# Patient Record
Sex: MALE | Race: WHITE | Employment: FULL TIME | URBAN - METROPOLITAN AREA
[De-identification: names, ages, dates, MRNs, and addresses within clinical notes are randomized per-mention and may not be internally consistent; named-entity substitution may affect disease eponyms.]

---

## 2021-09-06 ENCOUNTER — HOSPITAL ENCOUNTER (OUTPATIENT)
Age: 31
Discharge: HOME | End: 2021-09-06
Attending: PHYSICIAN ASSISTANT
Payer: COMMERCIAL

## 2021-09-06 VITALS
OXYGEN SATURATION: 100 % | DIASTOLIC BLOOD PRESSURE: 91 MMHG | HEART RATE: 60 BPM | RESPIRATION RATE: 16 BRPM | HEIGHT: 71 IN | SYSTOLIC BLOOD PRESSURE: 145 MMHG | WEIGHT: 210 LBS | TEMPERATURE: 98 F | BODY MASS INDEX: 29.4 KG/M2

## 2021-09-06 DIAGNOSIS — R03.0 ELEVATED BLOOD PRESSURE READING: ICD-10-CM

## 2021-09-06 DIAGNOSIS — S05.02XA ABRASION OF LEFT CORNEA, INITIAL ENCOUNTER: Primary | ICD-10-CM

## 2021-09-06 PROCEDURE — 99203 OFFICE O/P NEW LOW 30 MIN: CPT | Performed by: PHYSICIAN ASSISTANT

## 2021-09-06 PROCEDURE — 90715 TDAP VACCINE 7 YRS/> IM: CPT | Performed by: PHYSICIAN ASSISTANT

## 2021-09-06 PROCEDURE — 90471 IMMUNIZATION ADMIN: CPT | Performed by: PHYSICIAN ASSISTANT

## 2021-09-06 RX ORDER — POLYMYXIN B SULFATE AND TRIMETHOPRIM 1; 10000 MG/ML; [USP'U]/ML
1 SOLUTION OPHTHALMIC
Qty: 10 ML | Refills: 0 | Status: SHIPPED | OUTPATIENT
Start: 2021-09-06 | End: 2021-09-06

## 2021-09-06 RX ORDER — PURIFIED WATER 986 MG/ML
1 SOLUTION OPHTHALMIC ONCE
Status: COMPLETED | OUTPATIENT
Start: 2021-09-06 | End: 2021-09-06

## 2021-09-06 RX ORDER — POLYMYXIN B SULFATE AND TRIMETHOPRIM 1; 10000 MG/ML; [USP'U]/ML
1 SOLUTION OPHTHALMIC
Qty: 10 ML | Refills: 0 | Status: SHIPPED | OUTPATIENT
Start: 2021-09-06 | End: 2021-09-13

## 2021-09-06 RX ORDER — TETRACAINE HYDROCHLORIDE 5 MG/ML
1 SOLUTION OPHTHALMIC ONCE
Status: COMPLETED | OUTPATIENT
Start: 2021-09-06 | End: 2021-09-06

## 2021-09-06 NOTE — ED PROVIDER NOTES
Patient Seen in: [facility]    History   Patient presents with:  Eye Problem    Stated Complaint: eye problem    HPI    28-year-old male presents with chief complaint of dog scratch to the left eye.   Onset 1 hour prior to arrival.  Pain scal No abnormalities of mood, affect. Head: Normocephalic/atraumatic. Eyes: Pupils are equal round reactive to light. Conjunctivae within normal limits bilaterally. No ocular discharge. No eyelid swelling or erythema. Nontender to palpation.   Extraocular hypertension. Education regarding lifestyle modifications and the need for appropriate follow-up with their PCP to have their blood pressure re-checked within 24-48 hours was provided.     I spent a total of 22 minutes during chart review, obtaining histor

## 2022-02-07 ENCOUNTER — HOSPITAL ENCOUNTER (OUTPATIENT)
Dept: ULTRASOUND IMAGING | Facility: HOSPITAL | Age: 32
Discharge: HOME | End: 2022-02-07
Attending: NURSE PRACTITIONER

## 2022-02-07 ENCOUNTER — OFFICE VISIT (OUTPATIENT)
Dept: FAMILY MEDICINE CLINIC | Facility: CLINIC | Age: 32
End: 2022-02-07

## 2022-02-07 VITALS
BODY MASS INDEX: 26.35 KG/M2 | RESPIRATION RATE: 16 BRPM | SYSTOLIC BLOOD PRESSURE: 110 MMHG | DIASTOLIC BLOOD PRESSURE: 62 MMHG | HEART RATE: 61 BPM | WEIGHT: 188.19 LBS | OXYGEN SATURATION: 97 % | HEIGHT: 71 IN

## 2022-02-07 DIAGNOSIS — Z00.00 ADULT GENERAL MEDICAL EXAMINATION: Primary | ICD-10-CM

## 2022-02-07 DIAGNOSIS — Z86.16 HISTORY OF COVID-19: ICD-10-CM

## 2022-02-07 DIAGNOSIS — M79.604 RIGHT LEG PAIN: ICD-10-CM

## 2022-02-07 DIAGNOSIS — F41.1 GENERALIZED ANXIETY DISORDER: ICD-10-CM

## 2022-02-07 PROCEDURE — 99203 OFFICE O/P NEW LOW 30 MIN: CPT | Performed by: NURSE PRACTITIONER

## 2022-02-07 PROCEDURE — 93971 EXTREMITY STUDY: CPT | Performed by: NURSE PRACTITIONER

## 2022-02-07 PROCEDURE — 99385 PREV VISIT NEW AGE 18-39: CPT | Performed by: NURSE PRACTITIONER

## 2022-02-07 PROCEDURE — 3008F BODY MASS INDEX DOCD: CPT | Performed by: NURSE PRACTITIONER

## 2022-02-07 PROCEDURE — 3078F DIAST BP <80 MM HG: CPT | Performed by: NURSE PRACTITIONER

## 2022-02-07 PROCEDURE — 3074F SYST BP LT 130 MM HG: CPT | Performed by: NURSE PRACTITIONER

## 2023-06-02 ENCOUNTER — APPOINTMENT (OUTPATIENT)
Dept: GENERAL RADIOLOGY | Age: 33
End: 2023-06-02
Attending: NURSE PRACTITIONER
Payer: COMMERCIAL

## 2023-06-02 ENCOUNTER — HOSPITAL ENCOUNTER (OUTPATIENT)
Age: 33
Discharge: HOME | End: 2023-06-02
Payer: COMMERCIAL

## 2023-06-02 VITALS
DIASTOLIC BLOOD PRESSURE: 86 MMHG | OXYGEN SATURATION: 99 % | RESPIRATION RATE: 18 BRPM | SYSTOLIC BLOOD PRESSURE: 153 MMHG | HEART RATE: 68 BPM
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
## 2023-06-02 DIAGNOSIS — S92.254A CLOSED NONDISPLACED FRACTURE OF NAVICULAR BONE OF RIGHT FOOT, INITIAL ENCOUNTER: ICD-10-CM

## 2023-06-02 DIAGNOSIS — S82.841A BIMALLEOLAR FRACTURE OF RIGHT ANKLE, CLOSED, INITIAL ENCOUNTER: ICD-10-CM

## 2023-06-02 PROCEDURE — 29515 APPLICATION SHORT LEG SPLINT: CPT | Performed by: NURSE PRACTITIONER

## 2023-06-02 PROCEDURE — E0114 CRUTCH UNDERARM PAIR NO WOOD: HCPCS | Performed by: NURSE PRACTITIONER

## 2023-06-02 PROCEDURE — 73630 X-RAY EXAM OF FOOT: CPT | Performed by: NURSE PRACTITIONER

## 2023-06-02 PROCEDURE — 99213 OFFICE O/P EST LOW 20 MIN: CPT | Performed by: NURSE PRACTITIONER

## 2023-06-02 PROCEDURE — 73610 X-RAY EXAM OF ANKLE: CPT | Performed by: NURSE PRACTITIONER

## 2023-06-02 RX ORDER — HYDROCODONE BITARTRATE AND ACETAMINOPHEN 5; 325 MG/1; MG/1
1 TABLET ORAL EVERY 6 HOURS PRN
Qty: 10 TABLET | Refills: 0 | Status: SHIPPED | OUTPATIENT
Start: 2023-06-02

## 2023-06-02 NOTE — ED PROVIDER NOTES
Patient Seen in: [facility]      History   Patient presents with:  Fall  Leg or Foot Injury    Stated Complaint: Sprain, Minor - Right ankle    Subjective:   29-year-old male with no known  medical problems presents from home with a right ankle injury. Martín down approximately 4 steps last night. Was walking down the stairs in the dark and tripped. Rolled his ankle and landed twisted awkwardly. He did hit his knee. Complaining of right ankle pain. No specific knee pain. Has pain with weightbearing, he has been walking and putting weight onto his heel. Has been alternating Tylenol and Motrin at home as well as ice and heat. Denies numbness or tingling to the foot. No wounds or bleeding. Denies hitting head. The history is provided by the patient. No  was used. Objective:   History reviewed. No pertinent past medical history. No pertinent past surgical history. No pertinent social history. Review of Systems    Positive for stated complaint: Sprain, Minor - Right ankle  Other systems are as noted in HPI. Constitutional and vital signs reviewed. All other systems reviewed and negative except as noted above. Physical Exam     ED Triage Vitals [06/02/23 1832]   /86   Pulse 68   Resp 18   Temp    Temp src Temporal   SpO2 99 %   O2 Device None (Room air)       Current:/86   Pulse 68   Resp 18   SpO2 99%         Physical Exam  Vitals and nursing note reviewed. Constitutional:       General: He is not in acute distress. Appearance: Normal appearance. He is not ill-appearing or toxic-appearing. HENT:      Head: Normocephalic and atraumatic. Nose: Nose normal.      Mouth/Throat:      Mouth: Mucous membranes are moist.      Pharynx: Oropharynx is clear. Eyes:      Pupils: Pupils are equal, round, and reactive to light. Cardiovascular:      Rate and Rhythm: Normal rate and regular rhythm.       Pulses: Normal pulses. Pulmonary:      Effort: Pulmonary effort is normal. No respiratory distress. Breath sounds: Normal breath sounds. Abdominal:      General: Abdomen is flat. Palpations: Abdomen is soft. Musculoskeletal:         General: No signs of injury. Normal range of motion. Cervical back: Normal range of motion and neck supple. Right ankle: Swelling and ecchymosis present. No deformity. Tenderness present over the medial malleolus. Normal range of motion. Right Achilles Tendon: Normal. No tenderness or defects. Right foot: Swelling present. No deformity. Feet:    Skin:     General: Skin is warm and dry. Capillary Refill: Capillary refill takes less than 2 seconds. Neurological:      General: No focal deficit present. Mental Status: He is alert and oriented to person, place, and time. GCS: GCS eye subscore is 4. GCS verbal subscore is 5. GCS motor subscore is 6. Psychiatric:         Mood and Affect: Mood normal.         Behavior: Behavior normal.         Thought Content: Thought content normal.         Judgment: Judgment normal.               ED Course   Labs Reviewed - No data to display       XR FOOT, COMPLETE (MIN 3 VIEWS), RIGHT (CPT=73630)    Result Date: 6/2/2023  CONCLUSION:  1. Bimalleolar right ankle fracture. 2. Dorsal aspect of the navicular bone is irregular in contour about the talonavicular joint. This finding could represent an accessory ossicle or an age-indeterminate capsular avulsion fracture. Correlate clinically for overlying point tenderness. Dictated by (CST): Roxane Carroll MD on 6/02/2023 at 7:26 PM     Finalized by (CST): Roxane Carroll MD on 6/02/2023 at 7:28 PM          XR ANKLE (MIN 3 VIEWS), RIGHT (CPT=73610)    Result Date: 6/2/2023  CONCLUSION:  Bimalleolar right ankle fracture.     Dictated by (CST): Roxane Carroll MD on 6/02/2023 at 7:24 PM     Finalized by (CST): Roxane Carroll MD on 6/02/2023 at 7:26 PM          A short leg splint was applied. After application of the splint I returned and re-examined the patient. The splint was adequately immobilizing the joint/injury. Distal circulation and sensation was intact. MDM          Medical Decision Making  Right ankle/foot fracture  Tripped and fell forward down several stairs. Isolated right lower leg injury. No closed head injury. Diffuse swelling and tenderness to the right ankle. Most tenderness at the medial malleolus. X-ray of the ankle showing bimalleolar fracture. Navicular fracture. No dislocation  The joint appears stable  Patient does have a superficial abrasion across the top of his foot but no puncture or gaping wounds. No suspicion for open fracture. No closed head injury. No back pain. No indication for CT brain or imaging of spine  Short leg postmold placed by nursing staff. Crutches provided with training. Patient declined pain medication here  Consult with Ortho. Shortleg postmold and follow-up in clinic  Recommend ibuprofen. Norco as needed for severe pain. Elevate the leg. Ice. Keep the splint clean and dry. No weightbearing  Patient states that he will follow-up with [facility]. He was given a copy of his x-rays on a disc to bring for the follow-up appointment. Stressed the need to be seen in the office next week. Results and plan of care discussed with the patient/family. They are in agreement with discharge. They understand to follow up with their primary doctor or the referral physician for further evaluation, especially if no improvement. Also discussed the limitations of immediate care, patient is aware that if symptoms are worse they should go to the emergency room. Verbal and written discharge instructions were given.        Bimalleolar fracture of right ankle, closed, initial encounter: acute illness or injury  Closed nondisplaced fracture of navicular bone of right foot, initial encounter: acute illness or injury  Fall down stairs, initial encounter: acute illness or injury  Amount and/or Complexity of Data Reviewed  Radiology: ordered and independent interpretation performed. Details: right bimalleolar fracture  Discussion of management or test interpretation with external provider(s): Pedro Acevedo - ortho PA - short leg/crutches - they will see in clinic next week    Risk  OTC drugs. Prescription drug management. Disposition and Plan     Clinical Impression:  Fall down stairs, initial encounter  (primary encounter diagnosis)  Closed nondisplaced fracture of navicular bone of right foot, initial encounter  Bimalleolar fracture of right ankle, closed, initial encounter     Disposition:  Discharge  6/2/2023  7:43 pm    Follow-up:  Jordy Davey MD  [address] [place] [id number]  [phone]                Medications Prescribed:  Current Discharge Medication List    START taking these medications    HYDROcodone-acetaminophen 5-325 MG Oral Tab  Take 1 tablet by mouth every 6 (six) hours as needed for Pain.   Qty: 10 tablet Refills: 0

## 2023-06-02 NOTE — ED INITIAL ASSESSMENT (HPI)
Patient reports falling down the stairs last night in the dark. Patient's right ankle is swollen and patient with pain to medial part of ankle. Patient using heat and ice at home along with Ibuprofen.

## 2023-06-03 NOTE — DISCHARGE INSTRUCTIONS
Ibuprofen 3 tablets every 6 hours as needed for pain. Norco every 6 hours as needed for more severe pain. No drinking or driving after taking it. Ice. Elevate. No weightbearing. Use the crutches.   Rest.  Call orthopedics to schedule follow-up next week

## 2023-10-30 ENCOUNTER — V-VISIT (OUTPATIENT)
Dept: URGENT CARE | Age: 33
End: 2023-10-30

## 2023-10-30 VITALS
HEIGHT: 71 IN | WEIGHT: 212.52 LBS | DIASTOLIC BLOOD PRESSURE: 82 MMHG | BODY MASS INDEX: 29.75 KG/M2 | RESPIRATION RATE: 18 BRPM | OXYGEN SATURATION: 97 % | SYSTOLIC BLOOD PRESSURE: 118 MMHG | HEART RATE: 84 BPM | TEMPERATURE: 98 F

## 2023-10-30 DIAGNOSIS — L23.9 ALLERGIC CONTACT DERMATITIS, UNSPECIFIED TRIGGER: Primary | ICD-10-CM

## 2023-10-30 PROCEDURE — 99203 OFFICE O/P NEW LOW 30 MIN: CPT | Performed by: NURSE PRACTITIONER

## 2023-10-30 RX ORDER — TRIAMCINOLONE ACETONIDE 1 MG/G
1 OINTMENT TOPICAL 2 TIMES DAILY
Qty: 30 G | Refills: 0 | Status: SHIPPED | OUTPATIENT
Start: 2023-10-30

## 2023-10-30 RX ORDER — METHYLPREDNISOLONE 4 MG/1
4 TABLET ORAL SEE ADMIN INSTRUCTIONS
Qty: 21 TABLET | Refills: 0 | Status: SHIPPED | OUTPATIENT
Start: 2023-10-30

## (undated) NOTE — LETTER
Date & Time: 6/2/2023, 7:43 PM  Patient: Jacob Brown  Encounter Provider(s):    DEBORAH Kellogg       To Whom It May Concern:    Ellie Tello was seen and treated in our department on 6/2/2023. He can return to work with these limitations: no weight bearing, work from home, sitting at a desk .     If you have any questions or concerns, please do not hesitate to call.        _____________________________  Physician/AP[facility] Signature